# Patient Record
Sex: MALE | Race: BLACK OR AFRICAN AMERICAN | NOT HISPANIC OR LATINO | Employment: UNEMPLOYED | URBAN - METROPOLITAN AREA
[De-identification: names, ages, dates, MRNs, and addresses within clinical notes are randomized per-mention and may not be internally consistent; named-entity substitution may affect disease eponyms.]

---

## 2018-06-08 ENCOUNTER — OFFICE VISIT (OUTPATIENT)
Dept: URGENT CARE | Age: 28
End: 2018-06-08
Payer: COMMERCIAL

## 2018-06-08 VITALS
OXYGEN SATURATION: 100 % | SYSTOLIC BLOOD PRESSURE: 128 MMHG | WEIGHT: 185 LBS | TEMPERATURE: 97.5 F | RESPIRATION RATE: 16 BRPM | DIASTOLIC BLOOD PRESSURE: 76 MMHG | HEART RATE: 50 BPM | BODY MASS INDEX: 26.48 KG/M2 | HEIGHT: 70 IN

## 2018-06-08 DIAGNOSIS — Z02.4 DRIVER'S PERMIT PE (PHYSICAL EXAMINATION): Primary | ICD-10-CM

## 2018-06-08 NOTE — PROGRESS NOTES
[facility]        NAME: Harshil Raymond is a 25 y o  male  : 1990    MRN: [id number]  DATE: 2018  TIME: 8:47 AM    Assessment and Plan   's permit PE (physical examination) [Z02 4]  1  's permit PE (physical examination)           Patient Instructions       Follow up with PCP in 3-5 days  Proceed to  ER if symptoms worsen  Chief Complaint     Chief Complaint   Patient presents with    's License Exam     learners permit physical         History of Present Illness       Pt reports for a physical for learners permit  Pt with no current medical problems  Has had two knee surgeries but had no problems with knee  Review of Systems   Review of Systems   Constitutional: Negative  Eyes: Negative  Respiratory: Negative  Cardiovascular: Negative  Gastrointestinal: Negative  Musculoskeletal: Negative  Skin: Negative  Current Medications     No current outpatient prescriptions on file  Current Allergies     Allergies as of 2018    (No Known Allergies)            The following portions of the patient's history were reviewed and updated as appropriate: allergies, current medications, past family history, past medical history, past social history, past surgical history and problem list      No past medical history on file  No past surgical history on file  No family history on file  Medications have been verified  Objective   /76 (BP Location: Left arm, Patient Position: Sitting, Cuff Size: Adult)   Pulse (!) 50   Temp 97 5 °F (36 4 °C) (Tympanic)   Resp 16   Ht 5' 10" (1 778 m)   Wt 83 9 kg (185 lb)   SpO2 100%   BMI 26 54 kg/m²        Physical Exam     Physical Exam   Constitutional: He is oriented to person, place, and time  He appears well-developed and well-nourished  No distress  HENT:   Head: Normocephalic and atraumatic     Right Ear: External ear normal    Left Ear: External ear normal    Nose: Nose normal    Mouth/Throat: Oropharynx is clear and moist  No oropharyngeal exudate  Eyes: Conjunctivae and EOM are normal  Pupils are equal, round, and reactive to light  Right eye exhibits no discharge  Left eye exhibits no discharge  Neck: Normal range of motion  Neck supple  Cardiovascular: Normal rate, regular rhythm, normal heart sounds and intact distal pulses  Exam reveals no gallop and no friction rub  No murmur heard  Pulmonary/Chest: Effort normal and breath sounds normal  No respiratory distress  He has no wheezes  He has no rales  Abdominal: Soft  Bowel sounds are normal  There is no tenderness  Musculoskeletal: Normal range of motion  He exhibits no tenderness or deformity  Lymphadenopathy:     He has no cervical adenopathy  Neurological: He is alert and oriented to person, place, and time  He has normal reflexes  No cranial nerve deficit  Skin: Skin is warm and dry  Psychiatric: He has a normal mood and affect  His behavior is normal  Judgment and thought content normal    Nursing note and vitals reviewed

## 2018-06-08 NOTE — PATIENT INSTRUCTIONS
